# Patient Record
Sex: MALE | Race: BLACK OR AFRICAN AMERICAN | NOT HISPANIC OR LATINO | URBAN - METROPOLITAN AREA
[De-identification: names, ages, dates, MRNs, and addresses within clinical notes are randomized per-mention and may not be internally consistent; named-entity substitution may affect disease eponyms.]

---

## 2022-04-29 NOTE — PATIENT DISCUSSION
Discussed lid hygiene, warm compress and eyelid wash. Unable to find data currently about patient's on biologic medications who have also been given the COVID-19 vaccine.  Patient's gastroenterologist, Dr. Quintanilla, may have additional information.

## 2022-05-20 NOTE — PATIENT DISCUSSION
order/dispense CL with near vision adjustment OS. Discussed options for MONO VISION (Increased NEAR OS)and possible DISTANCE OU with using VUITY. Sample given at appointment today.

## 2022-06-23 ENCOUNTER — PREPPED CHART (OUTPATIENT)
Dept: URBAN - METROPOLITAN AREA CLINIC 16 | Facility: CLINIC | Age: 22
End: 2022-06-23

## 2022-06-24 NOTE — PATIENT DISCUSSION
order/dispense CL with near vision adjustment OS. Discussed options for MONO VISION (Increased NEAR OS) and possible DISTANCE OU with using VUITY. Sample given at appointment today.

## 2022-07-28 ENCOUNTER — COMPREHENSIVE EXAM (OUTPATIENT)
Dept: URBAN - METROPOLITAN AREA CLINIC 16 | Facility: CLINIC | Age: 22
End: 2022-07-28

## 2022-07-28 DIAGNOSIS — D57.3: ICD-10-CM

## 2022-07-28 DIAGNOSIS — H52.11: ICD-10-CM

## 2022-07-28 DIAGNOSIS — H52.223: ICD-10-CM

## 2022-07-28 PROCEDURE — 92014 COMPRE OPH EXAM EST PT 1/>: CPT

## 2022-07-28 PROCEDURE — 92015 DETERMINE REFRACTIVE STATE: CPT

## 2022-07-28 ASSESSMENT — KERATOMETRY
OD_K1POWER_DIOPTERS: 39.25
OS_K1POWER_DIOPTERS: 39.50
OD_K2POWER_DIOPTERS: 41.00
OS_AXISANGLE_DEGREES: 6
OD_AXISANGLE2_DEGREES: 82
OS_K2POWER_DIOPTERS: 40.75
OD_AXISANGLE_DEGREES: 172
OS_AXISANGLE2_DEGREES: 96

## 2022-07-28 ASSESSMENT — VISUAL ACUITY
OD_SC: 20/30+1
OS_SC: 20/20

## 2022-07-28 ASSESSMENT — TONOMETRY
OD_IOP_MMHG: 18
OS_IOP_MMHG: 19